# Patient Record
Sex: FEMALE | Race: WHITE | NOT HISPANIC OR LATINO | Employment: FULL TIME | URBAN - METROPOLITAN AREA
[De-identification: names, ages, dates, MRNs, and addresses within clinical notes are randomized per-mention and may not be internally consistent; named-entity substitution may affect disease eponyms.]

---

## 2020-06-25 ENCOUNTER — OFFICE VISIT (OUTPATIENT)
Dept: GYNECOLOGY | Facility: CLINIC | Age: 30
End: 2020-06-25
Payer: COMMERCIAL

## 2020-06-25 VITALS — DIASTOLIC BLOOD PRESSURE: 62 MMHG | HEART RATE: 73 BPM | SYSTOLIC BLOOD PRESSURE: 118 MMHG

## 2020-06-25 DIAGNOSIS — Z01.419 ENCOUNTER FOR GYNECOLOGICAL EXAMINATION WITH PAPANICOLAOU SMEAR OF CERVIX: ICD-10-CM

## 2020-06-25 DIAGNOSIS — L98.9 SKIN LESION: ICD-10-CM

## 2020-06-25 DIAGNOSIS — Z01.419 ENCOUNTER FOR GYNECOLOGICAL EXAMINATION WITHOUT ABNORMAL FINDING: Primary | ICD-10-CM

## 2020-06-25 PROCEDURE — S0612 ANNUAL GYNECOLOGICAL EXAMINA: HCPCS | Performed by: OBSTETRICS & GYNECOLOGY

## 2020-06-25 PROCEDURE — G0145 SCR C/V CYTO,THINLAYER,RESCR: HCPCS | Performed by: OBSTETRICS & GYNECOLOGY

## 2020-06-25 RX ORDER — FEXOFENADINE HCL 180 MG/1
180 TABLET ORAL
COMMUNITY
End: 2021-06-29

## 2020-06-25 RX ORDER — CLONAZEPAM 1 MG/1
TABLET ORAL
COMMUNITY
Start: 2019-05-02

## 2020-06-25 RX ORDER — SERTRALINE HYDROCHLORIDE 100 MG/1
200 TABLET, FILM COATED ORAL
COMMUNITY
Start: 2020-03-24

## 2020-06-25 RX ORDER — FLUTICASONE PROPIONATE 50 MCG
1 SPRAY, SUSPENSION (ML) NASAL DAILY
COMMUNITY

## 2020-07-01 LAB
LAB AP GYN PRIMARY INTERPRETATION: NORMAL
Lab: NORMAL

## 2020-07-20 DIAGNOSIS — B37.9 CANDIDIASIS: Primary | ICD-10-CM

## 2020-07-20 NOTE — PROGRESS NOTES
Patient called stating she has another yeast infection and requested Terconazole 7    Prescription was called into [facility] in [place] by [place], [place]

## 2020-08-06 ENCOUNTER — OFFICE VISIT (OUTPATIENT)
Dept: GYNECOLOGY | Facility: CLINIC | Age: 30
End: 2020-08-06

## 2020-08-06 VITALS — SYSTOLIC BLOOD PRESSURE: 112 MMHG | DIASTOLIC BLOOD PRESSURE: 68 MMHG | HEART RATE: 62 BPM | HEIGHT: 67 IN

## 2020-08-06 DIAGNOSIS — L91.8 ACHROCHORDON: Primary | ICD-10-CM

## 2020-08-06 PROCEDURE — 99212 OFFICE O/P EST SF 10 MIN: CPT | Performed by: OBSTETRICS & GYNECOLOGY

## 2020-08-06 PROCEDURE — 88305 TISSUE EXAM BY PATHOLOGIST: CPT | Performed by: PATHOLOGY

## 2020-08-06 PROCEDURE — 11421 EXC H-F-NK-SP B9+MARG 0.6-1: CPT | Performed by: OBSTETRICS & GYNECOLOGY

## 2020-08-06 NOTE — PROGRESS NOTES
Patient presents to the office today for excision of a skin lesion suspected acrochordon below the left breast   This is irritated from her [place]  Procedure:  Skin was prepped with the Betadine  The area was anesthetized with approximately 5 cc of 1% xylocaine  Using the scissors the lesion was excised in its entirety  Hemostasis was secured with silver nitrate  A Band-Aid was placed  Impression:  Acrochordon      Plan:  Return to the office p r n /annual

## 2021-03-05 ENCOUNTER — TELEPHONE (OUTPATIENT)
Dept: GYNECOLOGY | Facility: CLINIC | Age: 31
End: 2021-03-05

## 2021-03-05 DIAGNOSIS — B37.9 CANDIDIASIS: ICD-10-CM

## 2021-03-05 NOTE — TELEPHONE ENCOUNTER
Michael Colon took a call and Pt   States She has a yeast infection Please send medication to [facility]

## 2021-03-12 ENCOUNTER — OFFICE VISIT (OUTPATIENT)
Dept: GYNECOLOGY | Facility: CLINIC | Age: 31
End: 2021-03-12
Payer: COMMERCIAL

## 2021-03-12 VITALS — SYSTOLIC BLOOD PRESSURE: 104 MMHG | HEART RATE: 84 BPM | DIASTOLIC BLOOD PRESSURE: 70 MMHG | HEIGHT: 67 IN

## 2021-03-12 DIAGNOSIS — Z72.51 HIGH RISK HETEROSEXUAL BEHAVIOR: ICD-10-CM

## 2021-03-12 DIAGNOSIS — B37.3 MONILIAL VULVOVAGINITIS: ICD-10-CM

## 2021-03-12 DIAGNOSIS — Z11.3 SCREENING EXAMINATION FOR STD (SEXUALLY TRANSMITTED DISEASE): Primary | ICD-10-CM

## 2021-03-12 DIAGNOSIS — N89.8 VAGINAL DISCHARGE: ICD-10-CM

## 2021-03-12 LAB
BV WHIFF TEST VAG QL: ABNORMAL
CLUE CELLS SPEC QL WET PREP: ABNORMAL
PH SMN: 4.5 [PH]
SL AMB POCT WET MOUNT: ABNORMAL
T VAGINALIS VAG QL WET PREP: ABNORMAL
YEAST VAG QL WET PREP: ABNORMAL

## 2021-03-12 PROCEDURE — 87210 SMEAR WET MOUNT SALINE/INK: CPT | Performed by: OBSTETRICS & GYNECOLOGY

## 2021-03-12 PROCEDURE — 99213 OFFICE O/P EST LOW 20 MIN: CPT | Performed by: OBSTETRICS & GYNECOLOGY

## 2021-03-12 PROCEDURE — 87591 N.GONORRHOEAE DNA AMP PROB: CPT | Performed by: OBSTETRICS & GYNECOLOGY

## 2021-03-12 PROCEDURE — 87491 CHLMYD TRACH DNA AMP PROBE: CPT | Performed by: OBSTETRICS & GYNECOLOGY

## 2021-03-12 RX ORDER — QUETIAPINE FUMARATE 25 MG/1
25 TABLET, FILM COATED ORAL
COMMUNITY

## 2021-03-12 RX ORDER — LEVOCETIRIZINE DIHYDROCHLORIDE 5 MG/1
5 TABLET, FILM COATED ORAL EVERY EVENING
COMMUNITY

## 2021-03-12 NOTE — PROGRESS NOTES
Assessment/Plan:    Discussed yeast on WM  Patient will complete course of Terazole and call if sx persist  STI counseling done, 100% condom use encouraged  Script given for blood work  BC discussed  Patient will call if interested  Has triamcinolone cream and was advised to use it bid for 1 weeks for external irritation  I have spent 15 mins with patient today in which greater than 50% of this time was spent in counseling  Diagnoses and all orders for this visit:    Screening examination for STD (sexually transmitted disease)  -     Cancel: Chlamydia/GC amplified DNA by PCR  -     Hepatitis B e antigen; Future  -     HIV 1/2 Antigen/Antibody (4th Generation) w Reflex [facility]; Future  -     RPR; Future  -     Chlamydia/GC amplified DNA by PCR    High risk heterosexual behavior  -     Hepatitis B e antigen; Future  -     HIV 1/2 Antigen/Antibody (4th Generation) w Reflex [facility]; Future  -     RPR; Future  -     Chlamydia/GC amplified DNA by PCR    Vaginal discharge  -     POCT wet mount    Monilial vulvovaginitis    Other orders  -     levocetirizine (XYZAL) 5 MG tablet; Take 5 mg by mouth every evening  -     QUEtiapine (SEROquel) 25 mg tablet; Take 25 mg by mouth daily at bedtime        Subjective:      Patient ID: Miky Kamara is a 26 y o  female  Patient presents with vulvar irritation for about at week  She thought she had a yeast infection, called and was given Terazole 7 which she used for 4 days with some resolution  Last used 2 days ago  She now thinks the irritation is due to a change in detergent  She denies vaginal discharge, dysuria, odor  Her boyfriend of 2 years broke up with her in January and she had unprotected sex 2 weeks ago with a different partner  LMP 3/1/21         The following portions of the patient's history were reviewed and updated as appropriate: allergies, current medications, past family history, past medical history, past social history, past surgical history and problem list     Review of Systems   Constitutional: Negative  HENT: Negative  Respiratory: Negative  Cardiovascular: Negative  Gastrointestinal: Negative  Endocrine: Negative  Genitourinary: Negative for difficulty urinating, dyspareunia, dysuria, frequency, menstrual problem, pelvic pain, urgency, vaginal bleeding, vaginal discharge and vaginal pain  Musculoskeletal: Negative  Skin: Negative  Neurological: Negative  Psychiatric/Behavioral: Negative  Objective:      /70 (BP Location: Left arm)   Pulse 84   Ht 5' 7" (1 702 m)   LMP 03/01/2021          Physical Exam  Exam conducted with a chaperone present  Constitutional:       Appearance: Normal appearance  HENT:      Head: Normocephalic and atraumatic  Neck:      Musculoskeletal: Normal range of motion  Pulmonary:      Effort: Pulmonary effort is normal    Abdominal:      Hernia: There is no hernia in the left inguinal area or right inguinal area  Genitourinary:     Exam position: Supine  Pubic Area: Rash (moderate erythema diffusely throughout) present  Labia:         Right: No tenderness, lesion or injury  Left: No tenderness, lesion or injury  Urethra: No urethral pain, urethral swelling or urethral lesion  Vagina: No signs of injury and foreign body  Vaginal discharge (moderate amount of thick white discharge) and erythema present  No tenderness, bleeding or lesions  Cervix: No cervical motion tenderness, friability, lesion, erythema, cervical bleeding or eversion  Uterus: Not deviated, not enlarged, not fixed, not tender and no uterine prolapse  Adnexa:         Right: No mass, tenderness or fullness  Left: No mass, tenderness or fullness  Musculoskeletal: Normal range of motion  Lymphadenopathy:      Lower Body: No right inguinal adenopathy  No left inguinal adenopathy  Skin:     General: Skin is warm and dry     Neurological:      Mental Status: She is alert and oriented to person, place, and time  Psychiatric:         Mood and Affect: Mood normal          Behavior: Behavior normal          Thought Content:  Thought content normal          Judgment: Judgment normal

## 2021-03-13 LAB
C TRACH DNA SPEC QL NAA+PROBE: NEGATIVE
N GONORRHOEA DNA SPEC QL NAA+PROBE: NEGATIVE

## 2021-03-16 LAB
HBV E AG SERPL QL IA: NORMAL
HIV 1+2 AB+HIV1 P24 AG SERPL QL IA: NORMAL
RPR SER QL: NORMAL

## 2021-03-17 ENCOUNTER — TELEPHONE (OUTPATIENT)
Dept: GYNECOLOGY | Facility: CLINIC | Age: 31
End: 2021-03-17

## 2021-06-29 ENCOUNTER — ANNUAL EXAM (OUTPATIENT)
Dept: GYNECOLOGY | Facility: CLINIC | Age: 31
End: 2021-06-29
Payer: COMMERCIAL

## 2021-06-29 DIAGNOSIS — Z30.430 ENCOUNTER FOR IUD INSERTION: Primary | ICD-10-CM

## 2021-06-29 DIAGNOSIS — Z11.3 SCREENING FOR STDS (SEXUALLY TRANSMITTED DISEASES): Primary | ICD-10-CM

## 2021-06-29 DIAGNOSIS — Z12.31 ENCOUNTER FOR SCREENING MAMMOGRAM FOR BREAST CANCER: Primary | ICD-10-CM

## 2021-06-29 DIAGNOSIS — Z01.419 ENCOUNTER FOR GYNECOLOGICAL EXAMINATION WITH PAPANICOLAOU SMEAR OF CERVIX: ICD-10-CM

## 2021-06-29 DIAGNOSIS — N63.0 BREAST LUMP: ICD-10-CM

## 2021-06-29 DIAGNOSIS — Z30.430 ENCOUNTER FOR INSERTION OF PARAGARD IUD: ICD-10-CM

## 2021-06-29 PROCEDURE — S0612 ANNUAL GYNECOLOGICAL EXAMINA: HCPCS | Performed by: OBSTETRICS & GYNECOLOGY

## 2021-06-29 PROCEDURE — G0145 SCR C/V CYTO,THINLAYER,RESCR: HCPCS | Performed by: OBSTETRICS & GYNECOLOGY

## 2021-06-29 RX ORDER — MISOPROSTOL 200 UG/1
200 TABLET ORAL ONCE
Qty: 2 TABLET | Refills: 0 | Status: SHIPPED | OUTPATIENT
Start: 2021-06-29 | End: 2021-06-29

## 2021-06-29 NOTE — PROGRESS NOTES
Assessment/Plan:         Diagnoses and all orders for this visit:    Screening for STDs (sexually transmitted diseases)  -     Chlamydia/GC amplified DNA by PCR    Breast lump  -     Mammo diagnostic left w 3d & cad; Future  -     US breast left limited (diagnostic); Future    Encounter for insertion of ParaGard IUD  -     Chlamydia/GC amplified DNA by PCR    Encounter for gynecological examination with Papanicolaou smear of cervix  -     Liquid-based pap, screening        Subjective:      Patient ID: Chaitanya Maldonado is a 26 y o  female  HPI patient presents for annual examination  She had a Nely IUD placed in August of 2018  She denies any vaginal irritation, burning, discharge or bleeding  Denies any dysuria, hematuria urgency or urinary incontinence  No GI complaints  Would like to continue with Nely IUD  She will return to the office in August for replacement IUD    The following portions of the patient's history were reviewed and updated as appropriate:   She  has a past medical history of Anxiety and depression  She There are no problems to display for this patient  She  has a past surgical history that includes Cystectomy (2009)  Her family history includes Heart disease in her maternal grandmother; Hypertension in her mother  She  reports that she has been smoking  She has never used smokeless tobacco  She reports previous alcohol use  She reports that she does not use drugs    Current Outpatient Medications   Medication Sig Dispense Refill    clonazePAM (KlonoPIN) 1 mg tablet TAKE 1 TABLET BY MOUTH EVERY DAY AS NEEDED FOR ANXIETY      fluticasone (FLONASE) 50 mcg/act nasal spray 1 spray into each nostril daily      levocetirizine (XYZAL) 5 MG tablet Take 5 mg by mouth every evening      QUEtiapine (SEROquel) 25 mg tablet Take 25 mg by mouth daily at bedtime      sertraline (ZOLOFT) 100 mg tablet 200 mg       terconazole (TERAZOL 7) 0 4 % vaginal cream Insert 1 applicator into the vagina daily at bedtime (Patient not taking: Reported on 3/12/2021) 450 g 0     No current facility-administered medications for this visit  Current Outpatient Medications on File Prior to Visit   Medication Sig    clonazePAM (KlonoPIN) 1 mg tablet TAKE 1 TABLET BY MOUTH EVERY DAY AS NEEDED FOR ANXIETY    fluticasone (FLONASE) 50 mcg/act nasal spray 1 spray into each nostril daily    levocetirizine (XYZAL) 5 MG tablet Take 5 mg by mouth every evening    QUEtiapine (SEROquel) 25 mg tablet Take 25 mg by mouth daily at bedtime    sertraline (ZOLOFT) 100 mg tablet 200 mg     terconazole (TERAZOL 7) 0 4 % vaginal cream Insert 1 applicator into the vagina daily at bedtime (Patient not taking: Reported on 3/12/2021)    [DISCONTINUED] fexofenadine (ALLEGRA) 180 MG tablet Take 180 mg by mouth     No current facility-administered medications on file prior to visit  She is allergic to penicillins, neomycin, dust mite extract, lactose - food allergy, molds & smuts, and other       Review of Systems   Constitutional: Negative  HENT: Negative for sore throat and trouble swallowing  Gastrointestinal: Negative  Genitourinary: Negative  Objective: There were no vitals taken for this visit  Physical Exam  Vitals reviewed  Constitutional:       Appearance: Normal appearance  She is normal weight  Cardiovascular:      Rate and Rhythm: Normal rate and regular rhythm  Pulses: Normal pulses  Heart sounds: Normal heart sounds  No murmur heard  Pulmonary:      Effort: Pulmonary effort is normal  No respiratory distress  Breath sounds: Normal breath sounds  Chest:      Breasts:         Right: No swelling, bleeding, inverted nipple, mass, nipple discharge, skin change or tenderness  Left: Mass (1 5 cm firm , irregular mass 1:00 position) present  No swelling, bleeding, inverted nipple, nipple discharge, skin change or tenderness     Abdominal:      General: There is no distension  Palpations: Abdomen is soft  There is no mass  Tenderness: There is no abdominal tenderness  There is no guarding or rebound  Hernia: No hernia is present  There is no hernia in the left inguinal area or right inguinal area  Genitourinary:     General: Normal vulva  Labia:         Right: No rash, tenderness or lesion  Left: No rash, tenderness or lesion  Vagina: Normal       Cervix: Normal       Uterus: Normal        Adnexa:         Right: No mass, tenderness or fullness  Left: No mass, tenderness or fullness  Comments: IUD string visible  Musculoskeletal:      Cervical back: Normal range of motion and neck supple  No tenderness  Lymphadenopathy:      Cervical: No cervical adenopathy  Upper Body:      Right upper body: No supraclavicular or axillary adenopathy  Left upper body: No supraclavicular, axillary or pectoral adenopathy  Lower Body: No right inguinal adenopathy  No left inguinal adenopathy  Neurological:      Mental Status: She is alert

## 2021-07-07 LAB
LAB AP GYN PRIMARY INTERPRETATION: NORMAL
Lab: NORMAL

## 2021-07-19 ENCOUNTER — HOSPITAL ENCOUNTER (OUTPATIENT)
Dept: ULTRASOUND IMAGING | Facility: CLINIC | Age: 31
Discharge: HOME | End: 2021-07-19
Payer: COMMERCIAL

## 2021-07-19 ENCOUNTER — HOSPITAL ENCOUNTER (OUTPATIENT)
Dept: MAMMOGRAPHY | Facility: CLINIC | Age: 31
Discharge: HOME | End: 2021-07-19
Payer: COMMERCIAL

## 2021-07-19 VITALS — HEIGHT: 67 IN | BODY MASS INDEX: 26.68 KG/M2 | WEIGHT: 170 LBS

## 2021-07-19 DIAGNOSIS — N63.0 BREAST LUMP: ICD-10-CM

## 2021-07-19 PROCEDURE — 76642 ULTRASOUND BREAST LIMITED: CPT

## 2021-07-19 PROCEDURE — G0279 TOMOSYNTHESIS, MAMMO: HCPCS

## 2021-07-19 PROCEDURE — 77065 DX MAMMO INCL CAD UNI: CPT

## 2021-08-03 ENCOUNTER — DOCUMENTATION (OUTPATIENT)
Dept: GYNECOLOGY | Facility: CLINIC | Age: 31
End: 2021-08-03

## 2021-08-03 ENCOUNTER — TELEPHONE (OUTPATIENT)
Dept: GYNECOLOGY | Facility: CLINIC | Age: 31
End: 2021-08-03

## 2021-08-05 ENCOUNTER — PROCEDURE VISIT (OUTPATIENT)
Dept: GYNECOLOGY | Facility: CLINIC | Age: 31
End: 2021-08-05
Payer: COMMERCIAL

## 2021-08-05 VITALS
WEIGHT: 170 LBS | SYSTOLIC BLOOD PRESSURE: 114 MMHG | HEIGHT: 67 IN | DIASTOLIC BLOOD PRESSURE: 68 MMHG | HEART RATE: 62 BPM | BODY MASS INDEX: 26.68 KG/M2

## 2021-08-05 DIAGNOSIS — Z30.430 ENCOUNTER FOR IUD INSERTION: Primary | ICD-10-CM

## 2021-08-05 DIAGNOSIS — Z30.432 ENCOUNTER FOR IUD REMOVAL: ICD-10-CM

## 2021-08-05 LAB — SL AMB POCT URINE HCG: NORMAL

## 2021-08-05 PROCEDURE — 58301 REMOVE INTRAUTERINE DEVICE: CPT | Performed by: OBSTETRICS & GYNECOLOGY

## 2021-08-05 PROCEDURE — 81025 URINE PREGNANCY TEST: CPT | Performed by: OBSTETRICS & GYNECOLOGY

## 2021-08-05 NOTE — PROGRESS NOTES
Iud removal    Date/Time: 8/5/2021 9:21 AM  Performed by: Jamin Cruz  Authorized by: LONNIE Liu   Universal Protocol:  Consent: Verbal consent obtained  Written consent obtained  Risks and benefits: risks, benefits and alternatives were discussed (infection, bleeding, pain, failure to remove)  Consent given by: patient  Time out: Immediately prior to procedure a "time out" was called to verify the correct patient, procedure, equipment, support staff and site/side marked as required  Timeout called at: 8/5/2021 9:21 AM   Patient understanding: patient states understanding of the procedure being performed  Patient consent: the patient's understanding of the procedure matches consent given  Procedure consent: procedure consent matches procedure scheduled  Relevant documents: relevant documents present and verified  Test results: test results available and properly labeled  Required items: required blood products, implants, devices, and special equipment available  Patient identity confirmed: verbally with patient      Procedure:     Removed with no complications: no      Removal due to mechanical complications of IUD: no      Removal due to infection and inflammatory reaction: no      Other reason for removal:  Expiration of device  Comments:      IUD was not removed today  Upon several attempts, patient was extremely anxious with vaginismus, making a speculum exam impossible  Procedure aborted  She denies any sexual trauma  States she was very nervous because of the pain experienced with insertion  Upon discussion, patient believes she can RTO at later time for removal now that she knows removal does not hurt as much as insertion  She does not wish to have new IUD placed